# Patient Record
Sex: FEMALE | Race: WHITE | URBAN - METROPOLITAN AREA
[De-identification: names, ages, dates, MRNs, and addresses within clinical notes are randomized per-mention and may not be internally consistent; named-entity substitution may affect disease eponyms.]

---

## 2021-03-03 ENCOUNTER — OFFICE VISIT (OUTPATIENT)
Dept: ORTHOPEDIC SURGERY | Age: 21
End: 2021-03-03
Payer: COMMERCIAL

## 2021-03-03 VITALS
RESPIRATION RATE: 16 BRPM | WEIGHT: 230 LBS | HEART RATE: 87 BPM | BODY MASS INDEX: 34.07 KG/M2 | HEIGHT: 69 IN | OXYGEN SATURATION: 99 %

## 2021-03-03 DIAGNOSIS — S52.001A CLOSED FRACTURE OF PROXIMAL END OF RIGHT ULNA, UNSPECIFIED FRACTURE MORPHOLOGY, INITIAL ENCOUNTER: Primary | ICD-10-CM

## 2021-03-03 PROCEDURE — 1036F TOBACCO NON-USER: CPT | Performed by: PHYSICIAN ASSISTANT

## 2021-03-03 PROCEDURE — G8417 CALC BMI ABV UP PARAM F/U: HCPCS | Performed by: PHYSICIAN ASSISTANT

## 2021-03-03 PROCEDURE — G8484 FLU IMMUNIZE NO ADMIN: HCPCS | Performed by: PHYSICIAN ASSISTANT

## 2021-03-03 PROCEDURE — 99203 OFFICE O/P NEW LOW 30 MIN: CPT | Performed by: PHYSICIAN ASSISTANT

## 2021-03-03 PROCEDURE — G8427 DOCREV CUR MEDS BY ELIG CLIN: HCPCS | Performed by: PHYSICIAN ASSISTANT

## 2021-03-03 PROCEDURE — 25530 CLTX ULNAR SHFT FX W/O MNPJ: CPT | Performed by: PHYSICIAN ASSISTANT

## 2021-03-03 RX ORDER — TRAZODONE HYDROCHLORIDE 100 MG/1
200 TABLET ORAL NIGHTLY PRN
COMMUNITY
End: 2021-03-03

## 2021-03-03 RX ORDER — HYDROCODONE BITARTRATE AND ACETAMINOPHEN 7.5; 325 MG/1; MG/1
1 TABLET ORAL EVERY 8 HOURS PRN
Qty: 21 TABLET | Refills: 0 | Status: SHIPPED | OUTPATIENT
Start: 2021-03-03 | End: 2021-03-10

## 2021-03-03 RX ORDER — METHYLPHENIDATE HYDROCHLORIDE 5 MG/1
5 TABLET ORAL 2 TIMES DAILY
COMMUNITY
End: 2021-03-03

## 2021-03-03 RX ORDER — DEXTROAMPHETAMINE SACCHARATE, AMPHETAMINE ASPARTATE MONOHYDRATE, DEXTROAMPHETAMINE SULFATE AND AMPHETAMINE SULFATE 7.5; 7.5; 7.5; 7.5 MG/1; MG/1; MG/1; MG/1
30 CAPSULE, EXTENDED RELEASE ORAL DAILY
COMMUNITY
End: 2021-03-03

## 2021-03-03 RX ORDER — SERTRALINE HYDROCHLORIDE 100 MG/1
100 TABLET, FILM COATED ORAL DAILY
COMMUNITY
End: 2021-03-03

## 2021-03-03 RX ORDER — NORGESTIMATE AND ETHINYL ESTRADIOL
KIT
COMMUNITY
Start: 2021-01-07 | End: 2021-03-03

## 2021-03-03 SDOH — HEALTH STABILITY: MENTAL HEALTH: HOW OFTEN DO YOU HAVE A DRINK CONTAINING ALCOHOL?: PATIENT DECLINED

## 2021-03-03 ASSESSMENT — ENCOUNTER SYMPTOMS
GASTROINTESTINAL NEGATIVE: 1
EYES NEGATIVE: 1
RESPIRATORY NEGATIVE: 1

## 2021-03-03 NOTE — PATIENT INSTRUCTIONS
Nonweightbearing, No heavy lifting, pushing or pulling with the right arm  Keep splint clean and dry  Do not remove splint    Ice and elevate as needed  Take Norco as prescribed  Follow up in 1 week for repeat x-ray

## 2021-03-03 NOTE — PROGRESS NOTES
Review of Systems   Constitutional: Negative. HENT: Negative. Eyes: Negative. Respiratory: Negative. Cardiovascular: Negative. Gastrointestinal: Negative. Genitourinary: Negative. Musculoskeletal: Positive for arthralgias. Skin: Negative. Negative for rash. Neurological: Negative. Psychiatric/Behavioral: Negative. Roberto Bautista is a 23 y.o. female the presents the office today with a right proximal ulna fracture that she sustained when she fell at home against a wall. She went to the emergency department in [place] and had x-rays which were positive for a nondisplaced proximal ulnar shaft fracture. She was placed in a splint at that time and referred to orthopedics. She did take Percocet which was given to her at the emergency department and she is now currently out of it. No past medical history on file. No past surgical history on file. No family history on file.     Social History     Socioeconomic History    Marital status: Single     Spouse name: None    Number of children: None    Years of education: None    Highest education level: None   Occupational History    None   Social Needs    Financial resource strain: None    Food insecurity     Worry: None     Inability: None    Transportation needs     Medical: None     Non-medical: None   Tobacco Use    Smoking status: Never Smoker    Smokeless tobacco: Never Used   Substance and Sexual Activity    Alcohol use: Never     Frequency: Patient refused     Drinks per session: Patient refused     Binge frequency: Patient refused    Drug use: Never    Sexual activity: None   Lifestyle    Physical activity     Days per week: None     Minutes per session: None    Stress: None   Relationships    Social connections     Talks on phone: None     Gets together: None     Attends Holiness service: None     Active member of club or organization: None     Attends meetings of clubs or organizations: None Relationship status: None    Intimate partner violence     Fear of current or ex partner: None     Emotionally abused: None     Physically abused: None     Forced sexual activity: None   Other Topics Concern    None   Social History Narrative    None       Current Outpatient Medications   Medication Sig Dispense Refill    metFORMIN (GLUCOPHAGE) 500 MG tablet Take 500 mg by mouth 2 times daily (with meals)      Misc Natural Products (APPLE CIDER VINEGAR DIET PO) Take by mouth      HYDROcodone-acetaminophen (NORCO) 7.5-325 MG per tablet Take 1 tablet by mouth every 8 hours as needed for Pain for up to 7 days. Intended supply: 7 days. Take lowest dose possible to manage pain 21 tablet 0     No current facility-administered medications for this visit. Allergies   Allergen Reactions    Other      Pt denies allergy to effexor, sts allergic to unknown allergy medicine     Amoxicillin Rash    Venlafaxine Rash       Review of Systems:  See above      Physical Exam:   Pulse 87   Resp 16   Ht 5' 9\" (1.753 m)   Wt 230 lb (104.3 kg)   SpO2 99%   BMI 33.97 kg/m²        Gait is Normal.     Gen/Psych:Examination reveals a pleasant individual in no acute distress. The patient is oriented to time, place and person. The patient's mood and affect are appropriate. Patient appears well nourished. Body habitus is overweight    Head: Head is atraumatic normocephalic,  ears are symmetric,     Eyes: Eyes show equal pupils bilaterally, extraocular muscles intact    Ears:  Hearing is intact to normal voice at 5 feet    Nose: Nares are patent bilaterally, no epistaxis,no rhinorrhea     Lymph:  No obvious lymphedema in bilateral upper extremities     Skin intact in bilateral upper extremities with no ulcerations, lesions, rash, erythema. Vascular: There are no varicosities in bilateral upper  extremities, sensation intact to light touch over bilateral upper extremities.         Right arm: Minimal edema, minimal ecchymosis and no obvious deformity noted. Ulnar nerve, median nerve, radial nerve intact to physical exam  Active motion of the wrist within normal limits 40 degrees extension, 50 degrees flexion. Elbow range of motion limited due to recent immobilization. Outsiderecord review: Review of ER note and x-ray report    Imaging studies:  AP and lateral view of the right forearm taken and reviewed in the office today. The AP view is suboptimal but does show a fracture of the proximal shaft of the right ulna which appears to be in good alignment. The lateral view of the right forearm shows a nondisplaced transverse fracture of the proximal ulnar shaft with no angulation. The official read and interpretation of these x-rays will be done by the the [facility]           Impression:     Diagnosis Orders   1.  Closed fracture of proximal end of right ulna, unspecified fracture morphology, initial encounter  XR RADIUS ULNA RIGHT (2 VIEWS)    HYDROcodone-acetaminophen (NORCO) 7.5-325 MG per tablet    [facility] CLOSED RX ULNA SHAFT FX           Plan:    Patient Instructions   Nonweightbearing, No heavy lifting, pushing or pulling with the right arm  Keep splint clean and dry  Do not remove splint    Ice and elevate as needed  Take Norco as prescribed  Follow up in 1 week for repeat x-ray

## 2021-03-15 ENCOUNTER — OFFICE VISIT (OUTPATIENT)
Dept: ORTHOPEDIC SURGERY | Age: 21
End: 2021-03-15
Payer: COMMERCIAL

## 2021-03-15 VITALS — RESPIRATION RATE: 16 BRPM | BODY MASS INDEX: 34.07 KG/M2 | WEIGHT: 230 LBS | HEIGHT: 69 IN

## 2021-03-15 DIAGNOSIS — S52.001A CLOSED FRACTURE OF PROXIMAL END OF RIGHT ULNA, UNSPECIFIED FRACTURE MORPHOLOGY, INITIAL ENCOUNTER: Primary | ICD-10-CM

## 2021-03-15 PROCEDURE — 99024 POSTOP FOLLOW-UP VISIT: CPT | Performed by: PHYSICIAN ASSISTANT

## 2021-03-15 NOTE — PROGRESS NOTES
Ms. Yara Billings returns today for follow-up of a right ulna fracture. She presents in the splint that was placed at last visit. She reports her pain is better at 3/10 today. She reports having some swelling in her hand and fingers but is better today. Physical Exam:  NAD, [id number]  right Arm exam:     Passive range of motion  degrees at the elbow. Mild tenderness palpation at the fracture site         Xrays were obtained and reviewed  3 views of the right elbow show healing nondisplaced fracture of the shaft of the proximal ulna with mild callus formation around the fracture site suggesting progressive healing. Fracture line is still clearly visible but is diminishing slightly. No interval displacement, no interval angulation seen. The official read and interpretation of these x-rays will be done by the the [facility]         Impression:    Diagnosis Orders   1. Closed fracture of proximal end of right ulna, unspecified fracture morphology, initial encounter  Application long arm splint    [facility] CAST SUP LONG ARM SPLINT , ADULT  FIBREGLASS         Plan:   Patient Instructions   Posterior splint placed today  Continue non weight bearing, no pushing pulling ect. Wear splint at work.   May remove at rest for range of motion exercises  Follow up 3 weeks

## 2021-03-15 NOTE — PATIENT INSTRUCTIONS
Posterior splint placed today  Continue non weight bearing, no pushing pulling ect. Wear splint at work.   May remove at rest for range of motion exercises  Follow up 3 weeks